# Patient Record
Sex: FEMALE | Race: WHITE | NOT HISPANIC OR LATINO | URBAN - METROPOLITAN AREA
[De-identification: names, ages, dates, MRNs, and addresses within clinical notes are randomized per-mention and may not be internally consistent; named-entity substitution may affect disease eponyms.]

---

## 2018-09-27 ENCOUNTER — NEW PATIENT (OUTPATIENT)
Dept: URBAN - METROPOLITAN AREA CLINIC 85 | Facility: CLINIC | Age: 63
End: 2018-09-27
Payer: COMMERCIAL

## 2018-09-27 DIAGNOSIS — H25.13 AGE-RELATED NUCLEAR CATARACT, BILATERAL: Primary | ICD-10-CM

## 2018-09-27 PROCEDURE — 92004 COMPRE OPH EXAM NEW PT 1/>: CPT | Performed by: OPHTHALMOLOGY

## 2018-09-27 ASSESSMENT — INTRAOCULAR PRESSURE
OD: 18
OS: 18

## 2018-09-27 ASSESSMENT — VISUAL ACUITY
OD: 20/20
OS: 20/20

## 2020-02-03 ENCOUNTER — NEW PATIENT (OUTPATIENT)
Dept: URBAN - METROPOLITAN AREA CLINIC 85 | Facility: CLINIC | Age: 65
End: 2020-02-03
Payer: COMMERCIAL

## 2020-02-03 DIAGNOSIS — H04.123 DRY EYE SYNDROME OF BILATERAL LACRIMAL GLANDS: ICD-10-CM

## 2020-02-03 PROCEDURE — 92004 COMPRE OPH EXAM NEW PT 1/>: CPT | Performed by: OPTOMETRIST

## 2020-02-03 ASSESSMENT — VISUAL ACUITY
OD: 20/20
OS: 20/20

## 2020-02-03 ASSESSMENT — INTRAOCULAR PRESSURE
OS: 15
OD: 15

## 2021-08-19 ENCOUNTER — OFFICE VISIT (OUTPATIENT)
Dept: URBAN - METROPOLITAN AREA CLINIC 85 | Facility: CLINIC | Age: 66
End: 2021-08-19
Payer: COMMERCIAL

## 2021-08-19 DIAGNOSIS — H52.4 PRESBYOPIA: ICD-10-CM

## 2021-08-19 PROCEDURE — 92014 COMPRE OPH EXAM EST PT 1/>: CPT | Performed by: OPTOMETRIST

## 2021-08-19 ASSESSMENT — KERATOMETRY
OD: 44.00
OS: 44.00

## 2021-08-19 ASSESSMENT — INTRAOCULAR PRESSURE
OD: 16
OS: 17

## 2021-08-19 ASSESSMENT — VISUAL ACUITY
OS: 20/20
OD: 20/20

## 2023-02-28 ENCOUNTER — OFFICE VISIT (OUTPATIENT)
Dept: URBAN - METROPOLITAN AREA CLINIC 85 | Facility: CLINIC | Age: 68
End: 2023-02-28
Payer: COMMERCIAL

## 2023-02-28 DIAGNOSIS — H04.123 DRY EYE SYNDROME OF BILATERAL LACRIMAL GLANDS: Primary | ICD-10-CM

## 2023-02-28 DIAGNOSIS — H25.13 AGE-RELATED NUCLEAR CATARACT, BILATERAL: ICD-10-CM

## 2023-02-28 PROCEDURE — 92014 COMPRE OPH EXAM EST PT 1/>: CPT | Performed by: OPTOMETRIST

## 2023-02-28 ASSESSMENT — VISUAL ACUITY
OS: 20/20
OD: 20/20

## 2023-02-28 ASSESSMENT — INTRAOCULAR PRESSURE
OD: 17
OS: 17

## 2023-02-28 NOTE — IMPRESSION/PLAN
Impression: Dry eye syndrome of bilateral lacrimal glands: H04.123. Plan: Discussed dry eye diagnosis in detail. Recommend Systane Complete QID OU. Discussed prescription medication options such as Restasis and Alexa Churchman in the future. Also discussed potential of punctal plugs/punctal cautery.

## 2023-02-28 NOTE — IMPRESSION/PLAN
Impression: Age-related nuclear cataract, bilateral: H25.13. Plan: Discussed findings. Discussed option of CE/IOL. Discussed the fact that cataract surgery will not improve any other pre-existing eye problems. Discussed risks, potential benefits, potential complications, and alternatives to surgery. Patient defers to have  CE w/IOL consult with Dr. Irma Rowe or Dr. Arnaldo Bustamante at this time. Continue to monitor. RTC 1 year CEE or ASAP if decreased VA or pain.